# Patient Record
Sex: FEMALE | Race: WHITE
[De-identification: names, ages, dates, MRNs, and addresses within clinical notes are randomized per-mention and may not be internally consistent; named-entity substitution may affect disease eponyms.]

---

## 2017-08-04 ENCOUNTER — HOSPITAL ENCOUNTER (EMERGENCY)
Dept: HOSPITAL 12 - ER | Age: 47
LOS: 1 days | Discharge: HOME | End: 2017-08-05
Payer: COMMERCIAL

## 2017-08-04 VITALS — HEIGHT: 65 IN | WEIGHT: 204 LBS | BODY MASS INDEX: 33.99 KG/M2

## 2017-08-04 DIAGNOSIS — K59.00: ICD-10-CM
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
## 2017-08-04 DIAGNOSIS — Z90.49: ICD-10-CM

## 2017-08-04 DIAGNOSIS — M54.9: ICD-10-CM

## 2017-08-04 DIAGNOSIS — F43.10: ICD-10-CM

## 2017-08-04 DIAGNOSIS — J45.909: ICD-10-CM
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
## 2017-08-04 DIAGNOSIS — Z88.1: ICD-10-CM

## 2017-08-04 DIAGNOSIS — G89.29: ICD-10-CM

## 2017-08-04 DIAGNOSIS — K21.9: ICD-10-CM

## 2017-08-04 DIAGNOSIS — N83.209: ICD-10-CM

## 2017-08-04 DIAGNOSIS — Z88.6: ICD-10-CM
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
## 2017-08-04 DIAGNOSIS — E11.9: ICD-10-CM

## 2017-08-04 DIAGNOSIS — S09.90XA: Primary | ICD-10-CM

## 2017-08-04 DIAGNOSIS — I10: ICD-10-CM

## 2017-08-04 DIAGNOSIS — M54.30: ICD-10-CM
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
## 2017-08-04 LAB
APPEARANCE UR: (no result)
BILIRUB UR QL STRIP: NEGATIVE
COLOR UR: YELLOW
DEPRECATED SQUAMOUS URNS QL MICRO: (no result) /HPF
GLUCOSE UR STRIP-MCNC: NEGATIVE MG/DL
HCG UR QL: NEGATIVE
HGB UR QL STRIP: NEGATIVE
KETONES UR STRIP-MCNC: NEGATIVE MG/DL
LEUKOCYTE ESTERASE UR QL STRIP: (no result)
NITRITE UR QL STRIP: NEGATIVE
PH UR STRIP: 6 [PH] (ref 5–8)
PROT UR QL STRIP: NEGATIVE
RBC #/AREA URNS HPF: (no result) /HPF (ref 0–3)
SP GR UR STRIP: 1.02 (ref 1–1.03)
UROBILINOGEN UR STRIP-MCNC: 1 E.U./DL
WBC #/AREA URNS HPF: (no result) /HPF
WBC #/AREA URNS HPF: (no result) /HPF (ref 0–3)

## 2017-08-04 PROCEDURE — A4663 DIALYSIS BLOOD PRESSURE CUFF: HCPCS

## 2017-08-05 LAB
ALP SERPL-CCNC: 97 U/L (ref 50–136)
ALT SERPL W/O P-5'-P-CCNC: 22 U/L (ref 14–59)
AST SERPL-CCNC: 13 U/L (ref 15–37)
BILIRUB DIRECT SERPL-MCNC: 0.1 MG/DL (ref 0–0.2)
BILIRUB SERPL-MCNC: 0.3 MG/DL (ref 0.2–1)
WS STN SPEC: 7.3 G/DL (ref 6.4–8.2)

## 2017-08-29 ENCOUNTER — HOSPITAL ENCOUNTER (EMERGENCY)
Dept: HOSPITAL 12 - ER | Age: 47
Discharge: HOME | End: 2017-08-29
Payer: COMMERCIAL

## 2017-08-29 VITALS — BODY MASS INDEX: 33.32 KG/M2 | HEIGHT: 65 IN | WEIGHT: 200 LBS

## 2017-08-29 DIAGNOSIS — Z88.1: ICD-10-CM

## 2017-08-29 DIAGNOSIS — E11.9: ICD-10-CM

## 2017-08-29 DIAGNOSIS — J45.909: ICD-10-CM

## 2017-08-29 DIAGNOSIS — I10: ICD-10-CM

## 2017-08-29 DIAGNOSIS — Z88.6: ICD-10-CM

## 2017-08-29 DIAGNOSIS — M25.561: Primary | ICD-10-CM

## 2017-08-29 DIAGNOSIS — G89.29: ICD-10-CM

## 2017-08-29 DIAGNOSIS — Z90.49: ICD-10-CM

## 2017-08-29 DIAGNOSIS — K21.9: ICD-10-CM

## 2017-08-29 DIAGNOSIS — F17.200: ICD-10-CM

## 2017-08-29 DIAGNOSIS — G43.909: ICD-10-CM

## 2017-08-29 DIAGNOSIS — F43.10: ICD-10-CM

## 2017-08-29 PROCEDURE — A4663 DIALYSIS BLOOD PRESSURE CUFF: HCPCS

## 2017-10-10 ENCOUNTER — HOSPITAL ENCOUNTER (EMERGENCY)
Dept: HOSPITAL 12 - ER | Age: 47
Discharge: HOME | End: 2017-10-10
Payer: COMMERCIAL

## 2017-10-10 VITALS — BODY MASS INDEX: 32.62 KG/M2 | HEIGHT: 66 IN | WEIGHT: 203 LBS

## 2017-10-10 DIAGNOSIS — G89.29: Primary | ICD-10-CM

## 2017-10-10 DIAGNOSIS — E11.9: ICD-10-CM

## 2017-10-10 DIAGNOSIS — J45.909: ICD-10-CM

## 2017-10-10 DIAGNOSIS — F17.200: ICD-10-CM

## 2017-10-10 DIAGNOSIS — I10: ICD-10-CM

## 2017-10-10 DIAGNOSIS — D25.9: ICD-10-CM

## 2017-10-10 DIAGNOSIS — M25.561: ICD-10-CM

## 2017-10-10 DIAGNOSIS — N93.8: ICD-10-CM

## 2017-10-10 DIAGNOSIS — K21.9: ICD-10-CM

## 2017-10-10 PROCEDURE — A4663 DIALYSIS BLOOD PRESSURE CUFF: HCPCS

## 2017-10-10 NOTE — NUR
Pt c/o pain from uterine cysts, right knee injury, and HA, all 10/10.  Pt 
denies CP, SOB, dizziness, n/v, no other complaints, no distress noted.

## 2017-10-10 NOTE — NUR
Was giving pt RX and d/c instructions, pt became upset, refused RX and d/c 
paperwork and refused to sign.

## 2018-06-23 ENCOUNTER — HOSPITAL ENCOUNTER (EMERGENCY)
Dept: HOSPITAL 12 - ER | Age: 48
Discharge: HOME | End: 2018-06-23
Payer: COMMERCIAL

## 2018-06-23 VITALS — WEIGHT: 204 LBS | BODY MASS INDEX: 32.78 KG/M2 | HEIGHT: 66 IN

## 2018-06-23 VITALS — DIASTOLIC BLOOD PRESSURE: 72 MMHG | SYSTOLIC BLOOD PRESSURE: 124 MMHG

## 2018-06-23 DIAGNOSIS — F17.210: ICD-10-CM

## 2018-06-23 DIAGNOSIS — K21.9: ICD-10-CM

## 2018-06-23 DIAGNOSIS — K65.1: Primary | ICD-10-CM

## 2018-06-23 DIAGNOSIS — Z79.899: ICD-10-CM

## 2018-06-23 DIAGNOSIS — Z88.5: ICD-10-CM

## 2018-06-23 DIAGNOSIS — Z88.1: ICD-10-CM

## 2018-06-23 DIAGNOSIS — I10: ICD-10-CM

## 2018-06-23 DIAGNOSIS — J45.909: ICD-10-CM

## 2018-06-23 PROCEDURE — A4663 DIALYSIS BLOOD PRESSURE CUFF: HCPCS

## 2018-06-23 PROCEDURE — 99283 EMERGENCY DEPT VISIT LOW MDM: CPT

## 2018-06-23 PROCEDURE — 10060 I&D ABSCESS SIMPLE/SINGLE: CPT

## 2018-06-23 NOTE — NUR
Patient discharged to home in stable conditon.  Written and verbal after care 
instructions given. 

Patient verbalizes understanding of instructions. Pt accompanied by . No 
distress noted. Pt took all personal belongings.

## 2018-07-30 ENCOUNTER — HOSPITAL ENCOUNTER (EMERGENCY)
Dept: HOSPITAL 12 - ER | Age: 48
Discharge: HOME | End: 2018-07-30
Payer: COMMERCIAL

## 2018-07-30 VITALS — BODY MASS INDEX: 33.11 KG/M2 | WEIGHT: 206 LBS | HEIGHT: 66 IN

## 2018-07-30 DIAGNOSIS — J45.909: ICD-10-CM

## 2018-07-30 DIAGNOSIS — F17.200: ICD-10-CM
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
## 2018-07-30 DIAGNOSIS — Z88.1: ICD-10-CM

## 2018-07-30 DIAGNOSIS — Z90.89: ICD-10-CM

## 2018-07-30 DIAGNOSIS — S30.861A: ICD-10-CM

## 2018-07-30 DIAGNOSIS — Z88.5: ICD-10-CM

## 2018-07-30 DIAGNOSIS — N76.4: ICD-10-CM
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
## 2018-07-30 DIAGNOSIS — I10: ICD-10-CM
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
## 2018-07-30 DIAGNOSIS — K21.9: ICD-10-CM

## 2018-07-30 DIAGNOSIS — S70.362A: Primary | ICD-10-CM
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
## 2018-07-30 PROCEDURE — A4663 DIALYSIS BLOOD PRESSURE CUFF: HCPCS

## 2018-10-20 ENCOUNTER — HOSPITAL ENCOUNTER (EMERGENCY)
Dept: HOSPITAL 12 - ER | Age: 48
Discharge: HOME | End: 2018-10-20
Payer: COMMERCIAL

## 2018-10-20 VITALS — SYSTOLIC BLOOD PRESSURE: 111 MMHG | DIASTOLIC BLOOD PRESSURE: 76 MMHG

## 2018-10-20 VITALS — HEIGHT: 65 IN | BODY MASS INDEX: 33.99 KG/M2 | WEIGHT: 204 LBS

## 2018-10-20 DIAGNOSIS — Z88.1: ICD-10-CM

## 2018-10-20 DIAGNOSIS — Z71.6: ICD-10-CM

## 2018-10-20 DIAGNOSIS — E11.9: ICD-10-CM

## 2018-10-20 DIAGNOSIS — T81.41XA: Primary | ICD-10-CM

## 2018-10-20 DIAGNOSIS — J45.909: ICD-10-CM

## 2018-10-20 DIAGNOSIS — L02.211: ICD-10-CM

## 2018-10-20 DIAGNOSIS — Z88.5: ICD-10-CM

## 2018-10-20 DIAGNOSIS — Z90.89: ICD-10-CM

## 2018-10-20 DIAGNOSIS — I10: ICD-10-CM

## 2018-10-20 DIAGNOSIS — Z90.710: ICD-10-CM

## 2018-10-20 DIAGNOSIS — K21.9: ICD-10-CM

## 2018-10-20 DIAGNOSIS — F17.200: ICD-10-CM

## 2018-10-20 LAB
ALP SERPL-CCNC: 105 U/L (ref 50–136)
ALT SERPL W/O P-5'-P-CCNC: 34 U/L (ref 14–59)
AMORPH PHOS CRY URNS QL MICRO: (no result) /HPF
APPEARANCE UR: (no result)
AST SERPL-CCNC: 24 U/L (ref 15–37)
BASOPHILS # BLD AUTO: 0.1 K/UL (ref 0–8)
BASOPHILS NFR BLD AUTO: 1 % (ref 0–2)
BILIRUB DIRECT SERPL-MCNC: 0.1 MG/DL (ref 0–0.2)
BILIRUB SERPL-MCNC: 0.4 MG/DL (ref 0.2–1)
BILIRUB UR QL STRIP: NEGATIVE
BUN SERPL-MCNC: 6 MG/DL (ref 7–18)
CHLORIDE SERPL-SCNC: 102 MMOL/L (ref 98–107)
CO2 SERPL-SCNC: 25 MMOL/L (ref 21–32)
COLOR UR: YELLOW
CREAT SERPL-MCNC: 0.8 MG/DL (ref 0.6–1.3)
DEPRECATED SQUAMOUS URNS QL MICRO: (no result) /HPF
EOSINOPHIL # BLD AUTO: 0.2 K/UL (ref 0–0.7)
EOSINOPHIL NFR BLD AUTO: 2.4 % (ref 0–7)
GLUCOSE SERPL-MCNC: 273 MG/DL (ref 74–106)
GLUCOSE UR STRIP-MCNC: (no result) MG/DL
HCT VFR BLD AUTO: 37.6 % (ref 31.2–41.9)
HGB BLD-MCNC: 12.4 G/DL (ref 10.9–14.3)
KETONES UR STRIP-MCNC: NEGATIVE MG/DL
LEUKOCYTE ESTERASE UR QL STRIP: NEGATIVE
LIPASE SERPL-CCNC: 128 U/L (ref 73–393)
LYMPHOCYTES # BLD AUTO: 1.7 K/UL (ref 20–40)
LYMPHOCYTES NFR BLD AUTO: 21.5 % (ref 20.5–51.5)
MCH RBC QN AUTO: 28.3 UUG (ref 24.7–32.8)
MCHC RBC AUTO-ENTMCNC: 33 G/DL (ref 32.3–35.6)
MCV RBC AUTO: 86 FL (ref 75.5–95.3)
MONOCYTES # BLD AUTO: 0.5 K/UL (ref 2–10)
MONOCYTES NFR BLD AUTO: 6.5 % (ref 0–11)
MUCOUS THREADS URNS QL MICRO: (no result) /LPF
NEUTROPHILS # BLD AUTO: 5.5 K/UL (ref 1.8–8.9)
NEUTROPHILS NFR BLD AUTO: 68.6 % (ref 38.5–71.5)
NITRITE UR QL STRIP: NEGATIVE
PH UR STRIP: 7 [PH] (ref 5–8)
PLATELET # BLD AUTO: 183 K/UL (ref 179–408)
POTASSIUM SERPL-SCNC: 4.2 MMOL/L (ref 3.5–5.1)
PROT UR QL STRIP: NEGATIVE
RBC # BLD AUTO: 4.37 MIL/UL (ref 3.63–4.92)
RBC #/AREA URNS HPF: (no result) /HPF (ref 0–3)
SP GR UR STRIP: 1.02 (ref 1–1.03)
UROBILINOGEN UR STRIP-MCNC: 4 E.U./DL
WBC # BLD AUTO: 8.1 K/UL (ref 3.8–11.8)
WBC #/AREA URNS HPF: (no result) /HPF (ref 0–3)
WS STN SPEC: 7 G/DL (ref 6.4–8.2)

## 2018-10-20 PROCEDURE — 81001 URINALYSIS AUTO W/SCOPE: CPT

## 2018-10-20 PROCEDURE — 80048 BASIC METABOLIC PNL TOTAL CA: CPT

## 2018-10-20 PROCEDURE — 99406 BEHAV CHNG SMOKING 3-10 MIN: CPT

## 2018-10-20 PROCEDURE — 85025 COMPLETE CBC W/AUTO DIFF WBC: CPT

## 2018-10-20 PROCEDURE — 99285 EMERGENCY DEPT VISIT HI MDM: CPT

## 2018-10-20 PROCEDURE — 83690 ASSAY OF LIPASE: CPT

## 2018-10-20 PROCEDURE — 74177 CT ABD & PELVIS W/CONTRAST: CPT

## 2018-10-20 PROCEDURE — 36415 COLL VENOUS BLD VENIPUNCTURE: CPT

## 2018-10-20 PROCEDURE — 96365 THER/PROPH/DIAG IV INF INIT: CPT

## 2018-10-20 PROCEDURE — A4663 DIALYSIS BLOOD PRESSURE CUFF: HCPCS

## 2018-10-20 PROCEDURE — 80076 HEPATIC FUNCTION PANEL: CPT

## 2018-10-20 NOTE — NUR
Patient does not wish to proceed with medical care recommended by Dr. Ayala.  
Patient given information related to possible complications, up to and 
including death, which could occur as a result of leaving the hospital at this 
time.  Patient verbalizes understanding of risks involved due to leaving 
against medical advice.  Patient has signed AMA form.

## 2018-10-21 ENCOUNTER — HOSPITAL ENCOUNTER (INPATIENT)
Dept: HOSPITAL 12 - ER | Age: 48
LOS: 1 days | Discharge: HOME | DRG: 721 | End: 2018-10-22
Attending: NURSE PRACTITIONER
Payer: COMMERCIAL

## 2018-10-21 VITALS — SYSTOLIC BLOOD PRESSURE: 140 MMHG | DIASTOLIC BLOOD PRESSURE: 70 MMHG

## 2018-10-21 VITALS — WEIGHT: 204 LBS | BODY MASS INDEX: 33.99 KG/M2 | HEIGHT: 65 IN

## 2018-10-21 VITALS — DIASTOLIC BLOOD PRESSURE: 82 MMHG | SYSTOLIC BLOOD PRESSURE: 142 MMHG

## 2018-10-21 VITALS — SYSTOLIC BLOOD PRESSURE: 128 MMHG | DIASTOLIC BLOOD PRESSURE: 72 MMHG

## 2018-10-21 VITALS — DIASTOLIC BLOOD PRESSURE: 86 MMHG | SYSTOLIC BLOOD PRESSURE: 152 MMHG

## 2018-10-21 DIAGNOSIS — K74.60: ICD-10-CM

## 2018-10-21 DIAGNOSIS — M96.843: ICD-10-CM

## 2018-10-21 DIAGNOSIS — Z90.710: ICD-10-CM

## 2018-10-21 DIAGNOSIS — K76.0: ICD-10-CM

## 2018-10-21 DIAGNOSIS — J45.909: ICD-10-CM

## 2018-10-21 DIAGNOSIS — G89.29: ICD-10-CM

## 2018-10-21 DIAGNOSIS — E66.9: ICD-10-CM

## 2018-10-21 DIAGNOSIS — F43.10: ICD-10-CM

## 2018-10-21 DIAGNOSIS — Z87.891: ICD-10-CM

## 2018-10-21 DIAGNOSIS — E11.65: ICD-10-CM

## 2018-10-21 DIAGNOSIS — M54.9: ICD-10-CM
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
## 2018-10-21 DIAGNOSIS — Z87.01: ICD-10-CM

## 2018-10-21 DIAGNOSIS — Z87.820: ICD-10-CM

## 2018-10-21 DIAGNOSIS — Z90.49: ICD-10-CM

## 2018-10-21 DIAGNOSIS — D69.6: ICD-10-CM

## 2018-10-21 DIAGNOSIS — Z87.442: ICD-10-CM

## 2018-10-21 DIAGNOSIS — K21.9: ICD-10-CM

## 2018-10-21 DIAGNOSIS — I10: ICD-10-CM

## 2018-10-21 DIAGNOSIS — T81.42XA: Primary | ICD-10-CM

## 2018-10-21 DIAGNOSIS — N28.1: ICD-10-CM

## 2018-10-21 DIAGNOSIS — L02.211: ICD-10-CM
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
## 2018-10-21 DIAGNOSIS — E78.5: ICD-10-CM

## 2018-10-21 LAB
ALP SERPL-CCNC: 102 U/L (ref 50–136)
ALT SERPL W/O P-5'-P-CCNC: 32 U/L (ref 14–59)
APPEARANCE UR: (no result)
AST SERPL-CCNC: 23 U/L (ref 15–37)
BASOPHILS # BLD AUTO: 0 K/UL (ref 0–8)
BASOPHILS NFR BLD AUTO: 0.3 % (ref 0–2)
BILIRUB SERPL-MCNC: 0.6 MG/DL (ref 0.2–1)
BILIRUB UR QL STRIP: NEGATIVE
BUN SERPL-MCNC: 5 MG/DL (ref 7–18)
CHLORIDE SERPL-SCNC: 104 MMOL/L (ref 98–107)
CO2 SERPL-SCNC: 27 MMOL/L (ref 21–32)
COLOR UR: YELLOW
CREAT SERPL-MCNC: 0.8 MG/DL (ref 0.6–1.3)
DEPRECATED SQUAMOUS URNS QL MICRO: (no result) /HPF
EOSINOPHIL # BLD AUTO: 0.2 K/UL (ref 0–0.7)
EOSINOPHIL NFR BLD AUTO: 2.1 % (ref 0–7)
GLUCOSE SERPL-MCNC: 111 MG/DL (ref 74–106)
GLUCOSE UR STRIP-MCNC: NEGATIVE MG/DL
HCT VFR BLD AUTO: 37.6 % (ref 31.2–41.9)
HGB BLD-MCNC: 12.4 G/DL (ref 10.9–14.3)
HGB UR QL STRIP: (no result)
KETONES UR STRIP-MCNC: NEGATIVE MG/DL
LEUKOCYTE ESTERASE UR QL STRIP: NEGATIVE
LYMPHOCYTES # BLD AUTO: 1.4 K/UL (ref 20–40)
LYMPHOCYTES NFR BLD AUTO: 15.9 % (ref 20.5–51.5)
MAGNESIUM SERPL-MCNC: 1.8 MG/DL (ref 1.8–2.4)
MCH RBC QN AUTO: 28.2 UUG (ref 24.7–32.8)
MCHC RBC AUTO-ENTMCNC: 33 G/DL (ref 32.3–35.6)
MCV RBC AUTO: 85.6 FL (ref 75.5–95.3)
MONOCYTES # BLD AUTO: 0.5 K/UL (ref 2–10)
MONOCYTES NFR BLD AUTO: 5.8 % (ref 0–11)
MUCOUS THREADS URNS QL MICRO: (no result) /LPF
NEUTROPHILS # BLD AUTO: 6.5 K/UL (ref 1.8–8.9)
NEUTROPHILS NFR BLD AUTO: 75.9 % (ref 38.5–71.5)
NITRITE UR QL STRIP: NEGATIVE
PH UR STRIP: 7.5 [PH] (ref 5–8)
PHOSPHATE SERPL-MCNC: 3.9 MG/DL (ref 2.5–4.9)
PLATELET # BLD AUTO: 175 K/UL (ref 179–408)
POTASSIUM SERPL-SCNC: 4.1 MMOL/L (ref 3.5–5.1)
PROT UR QL STRIP: NEGATIVE
RBC # BLD AUTO: 4.39 MIL/UL (ref 3.63–4.92)
SP GR UR STRIP: 1.02 (ref 1–1.03)
UROBILINOGEN UR STRIP-MCNC: 1 E.U./DL
WBC # BLD AUTO: 8.5 K/UL (ref 3.8–11.8)
WBC #/AREA URNS HPF: (no result) /HPF (ref 0–3)
WS STN SPEC: 6.8 G/DL (ref 6.4–8.2)

## 2018-10-21 PROCEDURE — G0378 HOSPITAL OBSERVATION PER HR: HCPCS

## 2018-10-21 PROCEDURE — C9113 INJ PANTOPRAZOLE SODIUM, VIA: HCPCS

## 2018-10-21 PROCEDURE — 0WJF3ZZ INSPECTION OF ABDOMINAL WALL, PERCUTANEOUS APPROACH: ICD-10-PCS

## 2018-10-21 PROCEDURE — A4663 DIALYSIS BLOOD PRESSURE CUFF: HCPCS

## 2018-10-21 RX ADMIN — AMLODIPINE BESYLATE SCH MG: 10 TABLET ORAL at 15:05

## 2018-10-21 RX ADMIN — SODIUM CHLORIDE PRN MG: 9 INJECTION, SOLUTION INTRAVENOUS at 23:56

## 2018-10-21 RX ADMIN — SODIUM CHLORIDE PRN MLS/HR: 0.9 INJECTION, SOLUTION INTRAVENOUS at 07:48

## 2018-10-21 RX ADMIN — MONTELUKAST SCH MG: 10 TABLET, FILM COATED ORAL at 15:05

## 2018-10-21 RX ADMIN — SODIUM CHLORIDE PRN MG: 9 INJECTION, SOLUTION INTRAVENOUS at 11:50

## 2018-10-21 RX ADMIN — DEXTROSE SCH MG: 50 INJECTION, SOLUTION INTRAVENOUS at 11:46

## 2018-10-21 RX ADMIN — Medication SCH EACH: at 17:00

## 2018-10-21 RX ADMIN — SODIUM CHLORIDE PRN MLS/HR: 0.9 INJECTION, SOLUTION INTRAVENOUS at 21:27

## 2018-10-21 RX ADMIN — HYDROCODONE BITARTRATE AND ACETAMINOPHEN PRN TAB: 5; 325 TABLET ORAL at 20:44

## 2018-10-21 RX ADMIN — TAZOBACTAM SODIUM AND PIPERACILLIN SODIUM SCH MLS/HR: 375; 3 INJECTION, SOLUTION INTRAVENOUS at 21:27

## 2018-10-21 RX ADMIN — HYDROCODONE BITARTRATE AND ACETAMINOPHEN PRN TAB: 5; 325 TABLET ORAL at 11:50

## 2018-10-21 RX ADMIN — TAZOBACTAM SODIUM AND PIPERACILLIN SODIUM SCH MLS/HR: 375; 3 INJECTION, SOLUTION INTRAVENOUS at 08:28

## 2018-10-21 RX ADMIN — TAZOBACTAM SODIUM AND PIPERACILLIN SODIUM SCH MLS/HR: 375; 3 INJECTION, SOLUTION INTRAVENOUS at 14:12

## 2018-10-21 RX ADMIN — Medication SCH EACH: at 20:43

## 2018-10-21 RX ADMIN — Medication SCH EACH: at 11:56

## 2018-10-21 NOTE — NUR
Received admission orders for BENJA Perez.  D/C'd order of Lovenox as patient is 6 days 
post-op and ambulatory. Patient states she cannot have blood-thinners due to her subdural 
hematoma. Patient is refusing DVT pumps, states she doesn't like them.  Patient agrees she 
will get up and walk around today. "Patient also states, I'm going home tonight anyways." 
Patient slept through the night, no pain medication was given last night.

## 2018-10-21 NOTE — NUR
Received patient lying in bed. AAOX4. In no acute distress. Denies any pain or SOB at this 
time. IV siteon right hand intact and patent. IVF infusing. Was seen by Dr Keanu hodge. Patient for US guided drainage/aspiration tomorrow. NPO status not needed per MD. 
Will have consent form sign. Safety measure initiated and call bell within reach.

## 2018-10-21 NOTE — NUR
Pt ambulates to ER with c/o incision site pain from postop hysterectomy 6 days 
ago.  Pt was seen in this ER earlier and left AMA.  Pt has 24 staples noted on 
incision site.  No signs of infection are present.  VSS.

## 2018-10-21 NOTE — NUR
Pt. admitted to Med/Surg, under care of Erma Perez NP.

Diagnosis:  Postoperative Intra-Abdominal Abscess.

Belongs List completed.  MRSA swab done.

Report given to Nina SEN.

## 2018-10-21 NOTE — NUR
RECEIVED REPORT FROM NIGHT SHIFT NRUSE, PATIENT IN BED, AWAKE COMPLAINS OF PAIN BUT REFUSES 
MEDS.  CURRENTLY IN BED, BED IN LOW POSITION, SIDE RAILS UP X2.

## 2018-10-21 NOTE — NUR
Patient has not been cooperative with care, patient refusing medications, and non-compliant 
with diet.  Currently patient in bed, no distress noted at this time, bed in low position, 
side rails up x2.

## 2018-10-21 NOTE — NUR
Received pt to [facility]. VSS. Upon walking into the room and assessing patient, patient 
states "I hope you for dilaudid ordered for me." Pt states her pain is "15/10" in the 
abdominal area and had been like that for about 2 days.  I orient patient to unit and use of 
call light. Patient is ambulatory and can walk to the bathroom independently. Wound photos 
taken and placed in chart. paged NP Chris for admission orders.

-------------------------------------------------------------------------------

Addendum: 10/21/18 at 0635 by VETO BARTON RN

-------------------------------------------------------------------------------

Belongings list completed. Patient credit card and cash placed in envelope and placed in 
safe. Home meds sent to pharmacy. Patient states "I hope to go home tonight"

## 2018-10-21 NOTE — NUR
Received pt alert and oriented x 4, in no acute distress. Pt aware of plan of care. Safe 
environment implemented. Call light within reach.

## 2018-10-22 VITALS — SYSTOLIC BLOOD PRESSURE: 128 MMHG | DIASTOLIC BLOOD PRESSURE: 67 MMHG

## 2018-10-22 VITALS — SYSTOLIC BLOOD PRESSURE: 146 MMHG | DIASTOLIC BLOOD PRESSURE: 84 MMHG

## 2018-10-22 VITALS — DIASTOLIC BLOOD PRESSURE: 80 MMHG | SYSTOLIC BLOOD PRESSURE: 140 MMHG

## 2018-10-22 LAB
BASOPHILS # BLD AUTO: 0 K/UL (ref 0–8)
BASOPHILS NFR BLD AUTO: 0.2 % (ref 0–2)
BUN SERPL-MCNC: 9 MG/DL (ref 7–18)
CHLORIDE SERPL-SCNC: 103 MMOL/L (ref 98–107)
CHOLEST SERPL-MCNC: 178 MG/DL (ref ?–200)
CO2 SERPL-SCNC: 25 MMOL/L (ref 21–32)
CREAT SERPL-MCNC: 0.9 MG/DL (ref 0.6–1.3)
EOSINOPHIL # BLD AUTO: 0.2 K/UL (ref 0–0.7)
EOSINOPHIL NFR BLD AUTO: 2.6 % (ref 0–7)
GLUCOSE SERPL-MCNC: 129 MG/DL (ref 74–106)
HCT VFR BLD AUTO: 36.9 % (ref 31.2–41.9)
HDLC SERPL-MCNC: 24 MG/DL (ref 40–60)
HGB BLD-MCNC: 12.2 G/DL (ref 10.9–14.3)
LYMPHOCYTES # BLD AUTO: 1.8 K/UL (ref 20–40)
LYMPHOCYTES NFR BLD AUTO: 20.6 % (ref 20.5–51.5)
MAGNESIUM SERPL-MCNC: 1.8 MG/DL (ref 1.8–2.4)
MCH RBC QN AUTO: 28.3 UUG (ref 24.7–32.8)
MCHC RBC AUTO-ENTMCNC: 33 G/DL (ref 32.3–35.6)
MCV RBC AUTO: 85.9 FL (ref 75.5–95.3)
MONOCYTES # BLD AUTO: 0.4 K/UL (ref 2–10)
MONOCYTES NFR BLD AUTO: 4.9 % (ref 0–11)
NEUTROPHILS # BLD AUTO: 6.1 K/UL (ref 1.8–8.9)
NEUTROPHILS NFR BLD AUTO: 71.7 % (ref 38.5–71.5)
PHOSPHATE SERPL-MCNC: 3.7 MG/DL (ref 2.5–4.9)
PLATELET # BLD AUTO: 182 K/UL (ref 179–408)
POTASSIUM SERPL-SCNC: 3.8 MMOL/L (ref 3.5–5.1)
RBC # BLD AUTO: 4.3 MIL/UL (ref 3.63–4.92)
TRIGL SERPL-MCNC: 209 MG/DL (ref 30–150)
TSH SERPL DL<=0.005 MIU/L-ACNC: 1.88 MIU/ML (ref 0.36–3.74)
WBC # BLD AUTO: 8.5 K/UL (ref 3.8–11.8)

## 2018-10-22 RX ADMIN — AMLODIPINE BESYLATE SCH MG: 10 TABLET ORAL at 10:55

## 2018-10-22 RX ADMIN — HYDROCODONE BITARTRATE AND ACETAMINOPHEN PRN TAB: 5; 325 TABLET ORAL at 12:56

## 2018-10-22 RX ADMIN — Medication SCH EACH: at 11:01

## 2018-10-22 RX ADMIN — DEXTROSE SCH MG: 50 INJECTION, SOLUTION INTRAVENOUS at 08:29

## 2018-10-22 RX ADMIN — TAZOBACTAM SODIUM AND PIPERACILLIN SODIUM SCH MLS/HR: 375; 3 INJECTION, SOLUTION INTRAVENOUS at 05:00

## 2018-10-22 RX ADMIN — TAZOBACTAM SODIUM AND PIPERACILLIN SODIUM SCH MLS/HR: 375; 3 INJECTION, SOLUTION INTRAVENOUS at 13:52

## 2018-10-22 RX ADMIN — Medication SCH EACH: at 16:06

## 2018-10-22 RX ADMIN — MONTELUKAST SCH MG: 10 TABLET, FILM COATED ORAL at 10:55

## 2018-10-22 RX ADMIN — Medication SCH EACH: at 06:30

## 2018-10-22 NOTE — NUR
AAOX4. In no acute distress. Denies any pain or SOB. IV site on right hand intact and 
patent. IVF infusing. No adverse reaction noted from IV ABX. NPO status. Needs assessed and 
attended to. Safety measure maintained and call bell within reach.

## 2018-10-22 NOTE — NUR
CHARGE NURSE VERIFIED WITH RADIOLOGY THAT PT NEEDS TO BE ON NPO STATUS FOR PROCEDURE TODAY. 
PER PATIENT SHE HAS NOT HAD ANY FOOD OR FLUID INTAKE SINCE 12 MIDNIGHT.

## 2018-10-22 NOTE — NUR
D/C ORDERS RECEIVED NOTED AND CARRIED OUT,D/C HEPLOCK PER MD ORDERS,D/C INSTRUCTION AND 
EDUCATION GIVEN TO THE PT,PT SAID SHE WILL FOLLOW UP WITH HER PCP ,PT LEFT THE FACILITY VIA 
PRIVATE CAR IN STABLE CONDITION

## 2019-02-08 ENCOUNTER — HOSPITAL ENCOUNTER (EMERGENCY)
Dept: HOSPITAL 12 - ER | Age: 49
Discharge: HOME | End: 2019-02-08
Payer: COMMERCIAL

## 2019-02-08 VITALS — BODY MASS INDEX: 34.61 KG/M2 | WEIGHT: 215.38 LBS | HEIGHT: 66 IN

## 2019-02-08 DIAGNOSIS — R10.31: Primary | ICD-10-CM

## 2019-02-08 DIAGNOSIS — Z88.1: ICD-10-CM

## 2019-02-08 DIAGNOSIS — Z88.5: ICD-10-CM

## 2019-02-08 DIAGNOSIS — R10.32: ICD-10-CM

## 2019-02-08 DIAGNOSIS — K21.9: ICD-10-CM

## 2019-02-08 DIAGNOSIS — M54.9: ICD-10-CM

## 2019-02-08 DIAGNOSIS — J45.909: ICD-10-CM

## 2019-02-08 DIAGNOSIS — I10: ICD-10-CM

## 2019-02-08 DIAGNOSIS — G89.29: ICD-10-CM

## 2019-02-08 DIAGNOSIS — Z90.89: ICD-10-CM

## 2019-02-08 DIAGNOSIS — E11.9: ICD-10-CM

## 2019-02-08 DIAGNOSIS — Z79.2: ICD-10-CM

## 2019-02-08 DIAGNOSIS — Z79.899: ICD-10-CM

## 2019-02-08 DIAGNOSIS — F17.200: ICD-10-CM

## 2019-02-08 DIAGNOSIS — Z90.49: ICD-10-CM

## 2019-02-08 DIAGNOSIS — Z90.710: ICD-10-CM

## 2019-02-08 LAB
ALP SERPL-CCNC: 129 U/L (ref 50–136)
ALT SERPL W/O P-5'-P-CCNC: 37 U/L (ref 14–59)
APPEARANCE UR: (no result)
AST SERPL-CCNC: 30 U/L (ref 15–37)
BASOPHILS # BLD AUTO: 0.1 K/UL (ref 0–8)
BASOPHILS NFR BLD AUTO: 0.6 % (ref 0–2)
BILIRUB DIRECT SERPL-MCNC: 0.1 MG/DL (ref 0–0.2)
BILIRUB SERPL-MCNC: 0.4 MG/DL (ref 0.2–1)
BILIRUB UR QL STRIP: NEGATIVE
BUN SERPL-MCNC: 9 MG/DL (ref 7–18)
CHLORIDE SERPL-SCNC: 101 MMOL/L (ref 98–107)
CO2 SERPL-SCNC: 24 MMOL/L (ref 21–32)
COLOR UR: YELLOW
CREAT SERPL-MCNC: 0.7 MG/DL (ref 0.6–1.3)
DEPRECATED SQUAMOUS URNS QL MICRO: (no result) /HPF
EOSINOPHIL # BLD AUTO: 0.3 K/UL (ref 0–0.7)
EOSINOPHIL NFR BLD AUTO: 2.8 % (ref 0–7)
GLUCOSE SERPL-MCNC: 114 MG/DL (ref 74–106)
GLUCOSE UR STRIP-MCNC: NEGATIVE MG/DL
HCT VFR BLD AUTO: 43.1 % (ref 31.2–41.9)
HGB BLD-MCNC: 14.3 G/DL (ref 10.9–14.3)
HGB UR QL STRIP: NEGATIVE
KETONES UR STRIP-MCNC: NEGATIVE MG/DL
LEUKOCYTE ESTERASE UR QL STRIP: NEGATIVE
LIPASE SERPL-CCNC: 196 U/L (ref 73–393)
LYMPHOCYTES # BLD AUTO: 1.7 K/UL (ref 20–40)
LYMPHOCYTES NFR BLD AUTO: 18.7 % (ref 20.5–51.5)
MCH RBC QN AUTO: 27.6 UUG (ref 24.7–32.8)
MCHC RBC AUTO-ENTMCNC: 33 G/DL (ref 32.3–35.6)
MCV RBC AUTO: 83.3 FL (ref 75.5–95.3)
MONOCYTES # BLD AUTO: 0.5 K/UL (ref 2–10)
MONOCYTES NFR BLD AUTO: 5.9 % (ref 0–11)
MUCOUS THREADS URNS QL MICRO: (no result) /LPF
NEUTROPHILS # BLD AUTO: 6.4 K/UL (ref 1.8–8.9)
NEUTROPHILS NFR BLD AUTO: 72 % (ref 38.5–71.5)
NITRITE UR QL STRIP: NEGATIVE
PH UR STRIP: 6 [PH] (ref 5–8)
PLATELET # BLD AUTO: 123 K/UL (ref 179–408)
POTASSIUM SERPL-SCNC: 3.7 MMOL/L (ref 3.5–5.1)
RBC # BLD AUTO: 5.17 MIL/UL (ref 3.63–4.92)
RBC #/AREA URNS HPF: (no result) /HPF (ref 0–3)
SP GR UR STRIP: 1.02 (ref 1–1.03)
UROBILINOGEN UR STRIP-MCNC: 0.2 E.U./DL
WBC # BLD AUTO: 8.9 K/UL (ref 3.8–11.8)
WBC #/AREA URNS HPF: (no result) /HPF
WBC #/AREA URNS HPF: (no result) /HPF (ref 0–3)
WS STN SPEC: 8 G/DL (ref 6.4–8.2)

## 2019-02-08 PROCEDURE — A4663 DIALYSIS BLOOD PRESSURE CUFF: HCPCS

## 2019-02-08 RX ADMIN — SODIUM CHLORIDE ONE ML: 0.9 INJECTION, SOLUTION INTRAVENOUS at 17:52

## 2019-02-08 NOTE — NUR
-------------------------------------------------------------------------------

            *** Note undone in [facility] - 02/08/19 at 1846 by JOSE ***             

-------------------------------------------------------------------------------

RT CALLED FOR MARY.

## 2019-02-08 NOTE — NUR
PT A/OX4, PRESENTS TO THE ER C/O ABD PAIN AND DISTENSION SINCE SHE HAD HER 
HYSTERECTOMY IN OCT 2018. ABD IS VISIBLY DISTENDED, BOWEL SOUNDS ACTIVE, ABD 
FIRM ON PALPATION. 



PT DENIES C/P, SOB, N/V/D, DIZZINESS, HEADACHE.



ER MD AT BEDSIDE FOR MSE.

## 2019-03-08 ENCOUNTER — HOSPITAL ENCOUNTER (EMERGENCY)
Dept: HOSPITAL 12 - ER | Age: 49
Discharge: HOME | End: 2019-03-08
Payer: COMMERCIAL

## 2019-03-08 VITALS — WEIGHT: 212 LBS | HEIGHT: 65 IN | BODY MASS INDEX: 35.32 KG/M2

## 2019-03-08 DIAGNOSIS — J45.909: ICD-10-CM

## 2019-03-08 DIAGNOSIS — F17.290: ICD-10-CM

## 2019-03-08 DIAGNOSIS — E11.9: ICD-10-CM

## 2019-03-08 DIAGNOSIS — Z90.49: ICD-10-CM

## 2019-03-08 DIAGNOSIS — R10.11: Primary | ICD-10-CM

## 2019-03-08 DIAGNOSIS — Z90.710: ICD-10-CM

## 2019-03-08 DIAGNOSIS — Z88.5: ICD-10-CM

## 2019-03-08 DIAGNOSIS — Z88.1: ICD-10-CM

## 2019-03-08 DIAGNOSIS — Z79.899: ICD-10-CM

## 2019-03-08 DIAGNOSIS — K21.9: ICD-10-CM

## 2019-03-08 DIAGNOSIS — G89.29: ICD-10-CM

## 2019-03-08 DIAGNOSIS — Z71.6: ICD-10-CM

## 2019-03-08 DIAGNOSIS — I10: ICD-10-CM

## 2019-03-08 DIAGNOSIS — M54.9: ICD-10-CM

## 2019-03-08 LAB
ALP SERPL-CCNC: 148 U/L (ref 50–136)
ALT SERPL W/O P-5'-P-CCNC: 44 U/L (ref 14–59)
AST SERPL-CCNC: 40 U/L (ref 15–37)
BASOPHILS # BLD AUTO: 0.1 K/UL (ref 0–8)
BASOPHILS NFR BLD AUTO: 0.8 % (ref 0–2)
BILIRUB DIRECT SERPL-MCNC: 0.2 MG/DL (ref 0–0.2)
BILIRUB SERPL-MCNC: 0.5 MG/DL (ref 0.2–1)
BUN SERPL-MCNC: 8 MG/DL (ref 7–18)
CHLORIDE SERPL-SCNC: 101 MMOL/L (ref 98–107)
CO2 SERPL-SCNC: 25 MMOL/L (ref 21–32)
CREAT SERPL-MCNC: 0.8 MG/DL (ref 0.6–1.3)
EOSINOPHIL # BLD AUTO: 0.3 K/UL (ref 0–0.7)
EOSINOPHIL NFR BLD AUTO: 3.2 % (ref 0–7)
GLUCOSE SERPL-MCNC: 143 MG/DL (ref 74–106)
HCT VFR BLD AUTO: 44.4 % (ref 31.2–41.9)
HGB BLD-MCNC: 14.7 G/DL (ref 10.9–14.3)
LIPASE SERPL-CCNC: 171 U/L (ref 73–393)
LYMPHOCYTES # BLD AUTO: 1.7 K/UL (ref 20–40)
LYMPHOCYTES NFR BLD AUTO: 19 % (ref 20.5–51.5)
MCH RBC QN AUTO: 27.6 UUG (ref 24.7–32.8)
MCHC RBC AUTO-ENTMCNC: 33 G/DL (ref 32.3–35.6)
MCV RBC AUTO: 83.6 FL (ref 75.5–95.3)
MONOCYTES # BLD AUTO: 0.5 K/UL (ref 2–10)
MONOCYTES NFR BLD AUTO: 6.1 % (ref 0–11)
NEUTROPHILS # BLD AUTO: 6.4 K/UL (ref 1.8–8.9)
NEUTROPHILS NFR BLD AUTO: 70.9 % (ref 38.5–71.5)
PLATELET # BLD AUTO: 119 K/UL (ref 179–408)
POTASSIUM SERPL-SCNC: 3.6 MMOL/L (ref 3.5–5.1)
RBC # BLD AUTO: 5.31 MIL/UL (ref 3.63–4.92)
WBC # BLD AUTO: 9 K/UL (ref 3.8–11.8)
WS STN SPEC: 7.9 G/DL (ref 6.4–8.2)

## 2019-03-08 PROCEDURE — A4663 DIALYSIS BLOOD PRESSURE CUFF: HCPCS

## 2019-03-08 NOTE — NUR
PT A/OX4, PRESENTS TO THE ER C/O ABD PAIN SINCE OCT 2018. PT STATES "I WANT MY 
LIVER ENZYMES RECHECKED". PT REPORTS ABD PAIN NON-PROVOKED, UNABLE TO DESCRIBE 
QUALITY OF PAIN, DOES NOT RADIATE, 10/10, CONSTANT. VSS. PT DOES NOT APPEAR TO 
BE IN ANY APPARENT DISTRESS.



PT DENIES C/P, SOB, N/V/D, DIZZINESS, HEADACHE.

## 2019-11-10 ENCOUNTER — HOSPITAL ENCOUNTER (EMERGENCY)
Dept: HOSPITAL 12 - ER | Age: 49
Discharge: HOME | End: 2019-11-10
Payer: COMMERCIAL

## 2019-11-10 VITALS — DIASTOLIC BLOOD PRESSURE: 76 MMHG | SYSTOLIC BLOOD PRESSURE: 131 MMHG

## 2019-11-10 VITALS — BODY MASS INDEX: 35.36 KG/M2 | WEIGHT: 220 LBS | HEIGHT: 66 IN

## 2019-11-10 DIAGNOSIS — Z79.4: ICD-10-CM

## 2019-11-10 DIAGNOSIS — K21.9: ICD-10-CM

## 2019-11-10 DIAGNOSIS — Z88.5: ICD-10-CM

## 2019-11-10 DIAGNOSIS — R07.9: ICD-10-CM

## 2019-11-10 DIAGNOSIS — F41.9: ICD-10-CM

## 2019-11-10 DIAGNOSIS — F45.0: Primary | ICD-10-CM

## 2019-11-10 DIAGNOSIS — I10: ICD-10-CM

## 2019-11-10 DIAGNOSIS — Z87.891: ICD-10-CM

## 2019-11-10 DIAGNOSIS — Z88.1: ICD-10-CM

## 2019-11-10 DIAGNOSIS — R60.9: ICD-10-CM

## 2019-11-10 DIAGNOSIS — E11.65: ICD-10-CM

## 2019-11-10 DIAGNOSIS — Z90.710: ICD-10-CM

## 2019-11-10 DIAGNOSIS — R51: ICD-10-CM

## 2019-11-10 DIAGNOSIS — Z79.899: ICD-10-CM

## 2019-11-10 DIAGNOSIS — Z90.89: ICD-10-CM

## 2019-11-10 DIAGNOSIS — R04.0: ICD-10-CM

## 2019-11-10 DIAGNOSIS — Z90.49: ICD-10-CM

## 2019-11-10 LAB
ALP SERPL-CCNC: 136 U/L (ref 50–136)
ALT SERPL W/O P-5'-P-CCNC: 34 U/L (ref 14–59)
AMPHETAMINES UR QL SCN>1000 NG/ML: NEGATIVE
APPEARANCE UR: CLEAR
AST SERPL-CCNC: 38 U/L (ref 15–37)
BARBITURATES UR QL SCN: NEGATIVE
BASOPHILS # BLD AUTO: 0 K/UL (ref 0–8)
BASOPHILS NFR BLD AUTO: 0.5 % (ref 0–2)
BILIRUB DIRECT SERPL-MCNC: 0.1 MG/DL (ref 0–0.2)
BILIRUB SERPL-MCNC: 0.4 MG/DL (ref 0.2–1)
BILIRUB UR QL STRIP: NEGATIVE
BUN SERPL-MCNC: 14 MG/DL (ref 7–18)
CHLORIDE SERPL-SCNC: 101 MMOL/L (ref 98–107)
CO2 SERPL-SCNC: 28 MMOL/L (ref 21–32)
COCAINE UR QL SCN: NEGATIVE
COLOR UR: YELLOW
CREAT SERPL-MCNC: 1.1 MG/DL (ref 0.6–1.3)
DEPRECATED SQUAMOUS URNS QL MICRO: (no result) /HPF
EOSINOPHIL # BLD AUTO: 0.3 K/UL (ref 0–0.7)
EOSINOPHIL NFR BLD AUTO: 3.3 % (ref 0–7)
GLUCOSE SERPL-MCNC: 212 MG/DL (ref 74–106)
GLUCOSE UR STRIP-MCNC: NEGATIVE MG/DL
HCT VFR BLD AUTO: 41.4 % (ref 31.2–41.9)
HGB BLD-MCNC: 13.8 G/DL (ref 10.9–14.3)
HGB UR QL STRIP: (no result)
KETONES UR STRIP-MCNC: NEGATIVE MG/DL
LEUKOCYTE ESTERASE UR QL STRIP: NEGATIVE
LYMPHOCYTES # BLD AUTO: 1.4 K/UL (ref 20–40)
LYMPHOCYTES NFR BLD AUTO: 17.1 % (ref 20.5–51.5)
MCH RBC QN AUTO: 29.2 UUG (ref 24.7–32.8)
MCHC RBC AUTO-ENTMCNC: 33 G/DL (ref 32.3–35.6)
MCV RBC AUTO: 87.9 FL (ref 75.5–95.3)
MONOCYTES # BLD AUTO: 0.5 K/UL (ref 2–10)
MONOCYTES NFR BLD AUTO: 6.7 % (ref 0–11)
NEUTROPHILS # BLD AUTO: 5.9 K/UL (ref 1.8–8.9)
NEUTROPHILS NFR BLD AUTO: 72.4 % (ref 38.5–71.5)
NITRITE UR QL STRIP: NEGATIVE
OPIATES UR QL SCN: NEGATIVE
PCP UR QL SCN>25 NG/ML: NEGATIVE
PH UR STRIP: 7 [PH] (ref 5–8)
PLATELET # BLD AUTO: 92 K/UL (ref 179–408)
POTASSIUM SERPL-SCNC: 3.4 MMOL/L (ref 3.5–5.1)
RBC # BLD AUTO: 4.71 MIL/UL (ref 3.63–4.92)
RBC #/AREA URNS HPF: (no result) /HPF (ref 0–3)
SP GR UR STRIP: 1.01 (ref 1–1.03)
THC UR QL SCN>50 NG/ML: NEGATIVE
UROBILINOGEN UR STRIP-MCNC: 0.2 E.U./DL
WBC # BLD AUTO: 8.2 K/UL (ref 3.8–11.8)
WBC #/AREA URNS HPF: (no result) /HPF
WBC #/AREA URNS HPF: (no result) /HPF (ref 0–3)
WS STN SPEC: 7.2 G/DL (ref 6.4–8.2)

## 2019-11-10 PROCEDURE — A4663 DIALYSIS BLOOD PRESSURE CUFF: HCPCS

## 2020-03-18 ENCOUNTER — HOSPITAL ENCOUNTER (EMERGENCY)
Dept: HOSPITAL 12 - ER | Age: 50
Discharge: HOME | End: 2020-03-18
Payer: COMMERCIAL

## 2020-03-18 VITALS — HEIGHT: 65 IN | BODY MASS INDEX: 34.82 KG/M2 | WEIGHT: 209 LBS

## 2020-03-18 VITALS — SYSTOLIC BLOOD PRESSURE: 142 MMHG | DIASTOLIC BLOOD PRESSURE: 80 MMHG

## 2020-03-18 DIAGNOSIS — Z79.899: ICD-10-CM

## 2020-03-18 DIAGNOSIS — F17.200: ICD-10-CM

## 2020-03-18 DIAGNOSIS — K76.0: ICD-10-CM

## 2020-03-18 DIAGNOSIS — I10: ICD-10-CM

## 2020-03-18 DIAGNOSIS — Z90.710: ICD-10-CM

## 2020-03-18 DIAGNOSIS — K21.9: ICD-10-CM

## 2020-03-18 DIAGNOSIS — E11.9: ICD-10-CM

## 2020-03-18 DIAGNOSIS — Z79.4: ICD-10-CM

## 2020-03-18 DIAGNOSIS — J45.909: ICD-10-CM

## 2020-03-18 DIAGNOSIS — R10.10: Primary | ICD-10-CM

## 2020-03-18 DIAGNOSIS — Z87.820: ICD-10-CM

## 2020-03-18 DIAGNOSIS — E66.3: ICD-10-CM

## 2020-03-18 DIAGNOSIS — Z90.49: ICD-10-CM

## 2020-03-18 LAB
ALP SERPL-CCNC: 153 U/L (ref 50–136)
ALT SERPL W/O P-5'-P-CCNC: 37 U/L (ref 14–59)
AST SERPL-CCNC: 24 U/L (ref 15–37)
BASOPHILS # BLD AUTO: 0.1 K/UL (ref 0–8)
BASOPHILS NFR BLD AUTO: 0.6 % (ref 0–2)
BILIRUB DIRECT SERPL-MCNC: 0.2 MG/DL (ref 0–0.2)
BILIRUB SERPL-MCNC: 0.8 MG/DL (ref 0.2–1)
BUN SERPL-MCNC: 13 MG/DL (ref 7–18)
CHLORIDE SERPL-SCNC: 100 MMOL/L (ref 98–107)
CO2 SERPL-SCNC: 27 MMOL/L (ref 21–32)
CREAT SERPL-MCNC: 1 MG/DL (ref 0.6–1.3)
EOSINOPHIL # BLD AUTO: 0.2 K/UL (ref 0–0.7)
EOSINOPHIL NFR BLD AUTO: 2 % (ref 0–7)
GLUCOSE SERPL-MCNC: 164 MG/DL (ref 74–106)
HCT VFR BLD AUTO: 43.2 % (ref 31.2–41.9)
HGB BLD-MCNC: 14.4 G/DL (ref 10.9–14.3)
LIPASE SERPL-CCNC: 121 U/L (ref 73–393)
LYMPHOCYTES # BLD AUTO: 1.4 K/UL (ref 20–40)
LYMPHOCYTES NFR BLD AUTO: 14.4 % (ref 20.5–51.5)
MCH RBC QN AUTO: 29.5 UUG (ref 24.7–32.8)
MCHC RBC AUTO-ENTMCNC: 33 G/DL (ref 32.3–35.6)
MCV RBC AUTO: 88.7 FL (ref 75.5–95.3)
MONOCYTES # BLD AUTO: 0.8 K/UL (ref 2–10)
MONOCYTES NFR BLD AUTO: 8.5 % (ref 0–11)
NEUTROPHILS # BLD AUTO: 7.1 K/UL (ref 1.8–8.9)
NEUTROPHILS NFR BLD AUTO: 74.5 % (ref 38.5–71.5)
PLATELET # BLD AUTO: 100 K/UL (ref 179–408)
POTASSIUM SERPL-SCNC: 3.5 MMOL/L (ref 3.5–5.1)
RBC # BLD AUTO: 4.87 MIL/UL (ref 3.63–4.92)
WBC # BLD AUTO: 9.5 K/UL (ref 3.8–11.8)
WS STN SPEC: 7.3 G/DL (ref 6.4–8.2)

## 2020-03-18 PROCEDURE — A4663 DIALYSIS BLOOD PRESSURE CUFF: HCPCS

## 2020-03-18 NOTE — NUR
Patient is AOx4, speaking in complete sentences, speech is clear. Patient is 
able to follow /comprehend directions. Gait is stable. No cardiovascular 
distress noted. Rate and rhythm are regular. No CP. No respiratory distress 
noted. Respirations even & unlabored with symmetrical chest rise. No 
adventitious sounds noted. 

Chief complaint: C/O ABDOMINAL PAIN

 Patient denies Fever/Chills. No recent travel. - ETOH / -recreational drug use

## 2020-03-18 NOTE — NUR
Patient discharged to home in stable conditon.  Written and verbal after care 
instructions given. 

Patient verbalizes understanding of instructions. Patient ambulating with 
steady gait. NAD noted

## 2020-07-29 ENCOUNTER — HOSPITAL ENCOUNTER (EMERGENCY)
Dept: HOSPITAL 12 - ER | Age: 50
Discharge: LEFT BEFORE DISCHARGE | End: 2020-07-29
Payer: COMMERCIAL

## 2020-07-29 VITALS — SYSTOLIC BLOOD PRESSURE: 131 MMHG | DIASTOLIC BLOOD PRESSURE: 82 MMHG

## 2020-07-29 VITALS — HEIGHT: 64 IN | BODY MASS INDEX: 35.85 KG/M2 | WEIGHT: 210 LBS

## 2020-07-29 DIAGNOSIS — G89.29: ICD-10-CM

## 2020-07-29 DIAGNOSIS — I10: ICD-10-CM

## 2020-07-29 DIAGNOSIS — E11.65: ICD-10-CM

## 2020-07-29 DIAGNOSIS — R10.30: Primary | ICD-10-CM

## 2020-07-29 DIAGNOSIS — Z90.49: ICD-10-CM

## 2020-07-29 DIAGNOSIS — Z79.4: ICD-10-CM

## 2020-07-29 DIAGNOSIS — M54.9: ICD-10-CM

## 2020-07-29 DIAGNOSIS — Z90.710: ICD-10-CM

## 2020-07-29 DIAGNOSIS — J45.909: ICD-10-CM

## 2020-07-29 DIAGNOSIS — F17.210: ICD-10-CM

## 2020-07-29 DIAGNOSIS — Z79.899: ICD-10-CM

## 2020-07-29 DIAGNOSIS — K21.9: ICD-10-CM

## 2020-07-29 LAB
ALP SERPL-CCNC: 153 U/L (ref 50–136)
ALT SERPL W/O P-5'-P-CCNC: 26 U/L (ref 14–59)
APPEARANCE UR: CLEAR
AST SERPL-CCNC: 21 U/L (ref 15–37)
BASOPHILS # BLD AUTO: 0 K/UL (ref 0–8)
BASOPHILS NFR BLD AUTO: 0.4 % (ref 0–2)
BILIRUB DIRECT SERPL-MCNC: 0.2 MG/DL (ref 0–0.2)
BILIRUB SERPL-MCNC: 0.6 MG/DL (ref 0.2–1)
BILIRUB UR QL STRIP: NEGATIVE
BUN SERPL-MCNC: 9 MG/DL (ref 7–18)
CHLORIDE SERPL-SCNC: 97 MMOL/L (ref 98–107)
CO2 SERPL-SCNC: 25 MMOL/L (ref 21–32)
COLOR UR: (no result)
CREAT SERPL-MCNC: 1.2 MG/DL (ref 0.6–1.3)
EOSINOPHIL # BLD AUTO: 0.2 K/UL (ref 0–0.7)
EOSINOPHIL NFR BLD AUTO: 2.4 % (ref 0–7)
GLUCOSE SERPL-MCNC: 402 MG/DL (ref 74–106)
GLUCOSE UR STRIP-MCNC: (no result) MG/DL
HCT VFR BLD AUTO: 41.9 % (ref 31.2–41.9)
HGB BLD-MCNC: 13.8 G/DL (ref 10.9–14.3)
HGB UR QL STRIP: NEGATIVE
KETONES UR STRIP-MCNC: NEGATIVE MG/DL
LEUKOCYTE ESTERASE UR QL STRIP: NEGATIVE
LIPASE SERPL-CCNC: 148 U/L (ref 73–393)
LYMPHOCYTES # BLD AUTO: 1.3 K/UL (ref 20–40)
LYMPHOCYTES NFR BLD AUTO: 14.1 % (ref 20.5–51.5)
MCH RBC QN AUTO: 29.4 UUG (ref 24.7–32.8)
MCHC RBC AUTO-ENTMCNC: 33 G/DL (ref 32.3–35.6)
MCV RBC AUTO: 89 FL (ref 75.5–95.3)
MONOCYTES # BLD AUTO: 0.7 K/UL (ref 2–10)
MONOCYTES NFR BLD AUTO: 7.2 % (ref 0–11)
NEUTROPHILS # BLD AUTO: 7.2 K/UL (ref 1.8–8.9)
NEUTROPHILS NFR BLD AUTO: 75.9 % (ref 38.5–71.5)
NITRITE UR QL STRIP: NEGATIVE
PH UR STRIP: 5.5 [PH] (ref 5–8)
PLATELET # BLD AUTO: 85 K/UL (ref 179–408)
POTASSIUM SERPL-SCNC: 3.6 MMOL/L (ref 3.5–5.1)
RBC # BLD AUTO: 4.71 MIL/UL (ref 3.63–4.92)
SP GR UR STRIP: 1.02 (ref 1–1.03)
UROBILINOGEN UR STRIP-MCNC: 1 E.U./DL
WBC # BLD AUTO: 9.5 K/UL (ref 3.8–11.8)
WS STN SPEC: 7.2 G/DL (ref 6.4–8.2)

## 2020-07-29 PROCEDURE — A4663 DIALYSIS BLOOD PRESSURE CUFF: HCPCS

## 2020-07-29 NOTE — NUR
IV removed.  Catheter intact and site benign. Pressure and 4x4 gauze applied to 
site. No bleeding noted. Patient does not wish to proceed with medical care 
recommended by Dr. Mullins.  Patient given information related to possible 
complications, up to and including death, which could occur as a result of 
leaving the hospital at this time.  Patient verbalizes understanding of risks 
involved due to leaving against medical advice.  Patient declines to sign AMA 
form. Dr. Mullins aware. Cosigned AMA form with Elle SEN

## 2020-07-29 NOTE — NUR
Patient offered Tylenol for pain by Dr. Mullins. Patient declines to even try 
medication as "it does not work" per patient.

## 2023-10-21 ENCOUNTER — HOSPITAL ENCOUNTER (EMERGENCY)
Dept: HOSPITAL 12 - ER | Age: 53
Discharge: HOME | End: 2023-10-21
Payer: COMMERCIAL

## 2023-10-21 VITALS — TEMPERATURE: 98.3 F | DIASTOLIC BLOOD PRESSURE: 68 MMHG | SYSTOLIC BLOOD PRESSURE: 110 MMHG | OXYGEN SATURATION: 100 %

## 2023-10-21 VITALS — HEIGHT: 66 IN | BODY MASS INDEX: 34.55 KG/M2 | WEIGHT: 215 LBS

## 2023-10-21 DIAGNOSIS — K21.9: ICD-10-CM

## 2023-10-21 DIAGNOSIS — R11.0: ICD-10-CM

## 2023-10-21 DIAGNOSIS — E11.9: ICD-10-CM

## 2023-10-21 DIAGNOSIS — Z90.710: ICD-10-CM

## 2023-10-21 DIAGNOSIS — Z90.49: ICD-10-CM

## 2023-10-21 DIAGNOSIS — Z90.89: ICD-10-CM

## 2023-10-21 DIAGNOSIS — G89.29: ICD-10-CM

## 2023-10-21 DIAGNOSIS — Z88.1: ICD-10-CM

## 2023-10-21 DIAGNOSIS — M54.9: ICD-10-CM

## 2023-10-21 DIAGNOSIS — H92.03: Primary | ICD-10-CM

## 2023-10-21 DIAGNOSIS — Z88.5: ICD-10-CM

## 2023-10-21 DIAGNOSIS — Z87.891: ICD-10-CM

## 2023-10-21 DIAGNOSIS — Z79.899: ICD-10-CM

## 2023-10-21 DIAGNOSIS — J45.909: ICD-10-CM

## 2023-10-21 DIAGNOSIS — Z79.4: ICD-10-CM

## 2023-10-21 DIAGNOSIS — I10: ICD-10-CM

## 2023-10-21 PROCEDURE — A4663 DIALYSIS BLOOD PRESSURE CUFF: HCPCS

## 2023-10-21 PROCEDURE — A4606 OXYGEN PROBE USED W OXIMETER: HCPCS
